# Patient Record
Sex: MALE | Race: BLACK OR AFRICAN AMERICAN | NOT HISPANIC OR LATINO | URBAN - METROPOLITAN AREA
[De-identification: names, ages, dates, MRNs, and addresses within clinical notes are randomized per-mention and may not be internally consistent; named-entity substitution may affect disease eponyms.]

---

## 2018-06-21 ENCOUNTER — OUTPATIENT (OUTPATIENT)
Dept: OUTPATIENT SERVICES | Facility: HOSPITAL | Age: 23
LOS: 1 days | End: 2018-06-21

## 2018-06-21 VITALS
RESPIRATION RATE: 14 BRPM | WEIGHT: 220.02 LBS | DIASTOLIC BLOOD PRESSURE: 60 MMHG | HEART RATE: 64 BPM | HEIGHT: 71 IN | SYSTOLIC BLOOD PRESSURE: 114 MMHG | TEMPERATURE: 97 F

## 2018-06-21 DIAGNOSIS — N47.1 PHIMOSIS: ICD-10-CM

## 2018-06-21 NOTE — H&P PST ADULT - LYMPHATIC
posterior cervical R/supraclavicular L/supraclavicular R/posterior cervical L/anterior cervical L/anterior cervical R

## 2018-06-21 NOTE — H&P PST ADULT - TEACHING/LEARNING LEARNING PREFERENCES
computer/internet/video/written material/individual instruction/audio/pictorial/group instruction/verbal instruction/skill demonstration

## 2018-06-21 NOTE — H&P PST ADULT - NEGATIVE ENMT SYMPTOMS
no gum bleeding/no hearing difficulty/no post-nasal discharge/no nose bleeds/no ear pain/no throat pain/no dysphagia

## 2018-06-21 NOTE — H&P PST ADULT - NSANTHOSAYNRD_GEN_A_CORE
No. LARON screening performed.  STOP BANG Legend: 0-2 = LOW Risk; 3-4 = INTERMEDIATE Risk; 5-8 = HIGH Risk

## 2018-06-21 NOTE — H&P PST ADULT - RS GEN PE MLT RESP DETAILS PC
good air movement/no rhonchi/no wheezes/respirations non-labored/breath sounds equal/no rales/airway patent/clear to auscultation bilaterally

## 2018-06-27 ENCOUNTER — OUTPATIENT (OUTPATIENT)
Dept: OUTPATIENT SERVICES | Facility: HOSPITAL | Age: 23
LOS: 1 days | Discharge: HOME | End: 2018-06-27
Payer: COMMERCIAL

## 2018-06-27 VITALS
HEART RATE: 96 BPM | HEIGHT: 71 IN | RESPIRATION RATE: 18 BRPM | TEMPERATURE: 99 F | SYSTOLIC BLOOD PRESSURE: 130 MMHG | DIASTOLIC BLOOD PRESSURE: 71 MMHG | OXYGEN SATURATION: 99 % | WEIGHT: 220.02 LBS

## 2018-06-27 VITALS
SYSTOLIC BLOOD PRESSURE: 104 MMHG | TEMPERATURE: 98 F | HEART RATE: 70 BPM | DIASTOLIC BLOOD PRESSURE: 69 MMHG | RESPIRATION RATE: 16 BRPM | OXYGEN SATURATION: 100 %

## 2018-06-27 DIAGNOSIS — N47.1 PHIMOSIS: ICD-10-CM

## 2018-06-27 PROCEDURE — 88304 TISSUE EXAM BY PATHOLOGIST: CPT | Mod: 26

## 2018-06-27 NOTE — ASU DISCHARGE PLAN (ADULT/PEDIATRIC). - NOTIFY
Bleeding that does not stop/Pain not relieved by Medications/Fever greater than 101/Unable to Urinate/Persistent Nausea and Vomiting

## 2018-06-27 NOTE — ASU PREOPERATIVE ASSESSMENT, ADULT ([facility] ONLY) - TEACHING/LEARNING LEARNING PREFERENCES
audio/verbal instruction/individual instruction/pictorial/video/computer/internet/group instruction/skill demonstration/written material

## 2018-06-27 NOTE — ASU DISCHARGE PLAN (ADULT/PEDIATRIC). - SPECIAL INSTRUCTIONS
Remove dressing in 24 hrs. If it falls off before then you do not need to resecure it,  You may shower after it is off but do not scrub.

## 2024-11-18 NOTE — H&P PST ADULT - TOBACCO USE
Never smoker
Detail Level: Simple
Render Risk Assessment In Note?: no
Additional Notes: *11/13/24 - down to musccular fascia - sent for STAT read - closed with simple 3-0 - waiting for deep/circumfrential read\\n\\n11/18/24 - discussed case in depth with pt and wife. The specimen was sent for deep/circumfrential read but the pathology lab breadloafed the specimen instead of a deep/circumfrential. Based on the pathology report the margin is clear although narrow and because the deep/circumfrential margin was not fully examined Mohs was performed. The wound was opened up and the muscular fascia/fat was visualized. Based on the pathologist's map fascia and fat was taken. In stage 1 suspicious spindle-oid cells were noted so a second stage was performed to clear these cells. Clear margin was achieved and patient was educated on recurrence rates of DFSP. The area was then closed. Will monitor closely.\\n\\nBlocks 1/2 were both stored in formalin and will be sent to path for further examination.*

## 2025-04-03 NOTE — H&P PST ADULT - ALCOHOL USE HISTORY SINGLE SELECT
Has been noted by PCP in the past.  Per patient, monitoring for any symptoms in outpatient setting  TSH this admission decreased to 0.022.  Was also decreased at 0.3042 months ago  T3 free, 3.72 wnl  T3: 1.1 wnl  Continue to monitor with PCP outpatient   never